# Patient Record
Sex: MALE | Race: WHITE | NOT HISPANIC OR LATINO | URBAN - METROPOLITAN AREA
[De-identification: names, ages, dates, MRNs, and addresses within clinical notes are randomized per-mention and may not be internally consistent; named-entity substitution may affect disease eponyms.]

---
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
## 2023-10-31 NOTE — PROGRESS NOTES
"Chief Complaint  Chief Complaint   Patient presents with    Establish Care    enlarged pec muscles    Gynecomastia        Subjective          Torey Lee is here today to establish care. The following problems were discussed:     Family history- Paternal grandma- HTN, Mother- HTN, anxiety, Grandmother kidney, reflux.     Social history- Denies smoking, drinking, or illegal drug use.     Gynecomastia- Started at age 13-13 years old. Denies family history of it before and has never had labs done before either. He wants to get the surgery done to get them removed.     Overweight - Exercises regularly. Eats well balanced diet.       He is from [place]   Previous PCP was Dinorah Recinos   Marital status- not   Children- No  Works as [employer]   Exercise- regularly 5 times weekly  Diet- Eating well-balanced meals          Review of Systems   Constitutional:  Negative for chills and fever.   HENT:  Negative for congestion.    Eyes: Negative.    Respiratory:  Negative for shortness of breath.    Cardiovascular:  Negative for chest pain.   Gastrointestinal:  Negative for abdominal pain, nausea and vomiting.   Endocrine: Negative for polydipsia and polyuria.   Genitourinary:  Negative for difficulty urinating.   Musculoskeletal:  Negative for myalgias.   Skin:         Back acne    Allergic/Immunologic: Positive for environmental allergies.   Neurological:  Negative for dizziness, light-headedness and headache.   Psychiatric/Behavioral:  Negative for depressed mood. The patient is not nervous/anxious.         Objective   Vital Signs:   Vitals:    11/01/23 1340   BP: 132/74   Pulse: 85   Temp: 99.7 °F (37.6 °C)   SpO2: 100%      Estimated body mass index is 25.38 kg/m² as calculated from the following:    Height as of this encounter: 195.6 cm (77\").    Weight as of this encounter: 97.1 kg (214 lb).    BMI is >= 25 and <30. (Overweight) The following options were offered after discussion;: exercise " counseling/recommendations and nutrition counseling/recommendations            Patient screened positive for depression based on a PHQ-9 score of 0 . Follow-up recommendations include: PCP managing depression.        Physical Exam  Vitals reviewed.   Constitutional:       Appearance: Normal appearance. He is normal weight.   HENT:      Head: Normocephalic and atraumatic.      Right Ear: Tympanic membrane normal.      Ears:      Comments: Wax noted left middle ear      Nose: Nose normal.      Mouth/Throat:      Mouth: Mucous membranes are moist.      Pharynx: Oropharynx is clear.   Eyes:      Extraocular Movements: Extraocular movements intact.      Conjunctiva/sclera: Conjunctivae normal.      Pupils: Pupils are equal, round, and reactive to light.   Cardiovascular:      Rate and Rhythm: Normal rate and regular rhythm.      Pulses: Normal pulses.      Heart sounds: Normal heart sounds.      Comments: S1. S2 audible  Pulmonary:      Effort: Pulmonary effort is normal.      Breath sounds: Normal breath sounds.      Comments: On room air   Chest:      Comments: Enlarged pec muscles   Abdominal:      General: Abdomen is flat. Bowel sounds are normal.      Palpations: Abdomen is soft.   Musculoskeletal:         General: Normal range of motion.      Cervical back: Normal range of motion.   Skin:     General: Skin is warm and dry.   Neurological:      General: No focal deficit present.      Mental Status: He is alert and oriented to person, place, and time. Mental status is at baseline.   Psychiatric:         Mood and Affect: Mood normal.         Behavior: Behavior normal.         Thought Content: Thought content normal.         Judgment: Judgment normal.                Physical Exam   Result Review :             Procedures       Assessment and Plan     Diagnoses and all orders for this visit:    1. Gynecomastia (Primary)  Assessment & Plan:  Started at age 13-15 years old. Denies family history of it before and has never  had labs done before either. He wants to get the surgery done to get them removed. He lifts weights a lot as well, but had this before his weight lifting.     Check TSH, CMP, prolactin, estrogen, and testosterone     Orders:  -     TSH+Free T4; Future  -     Prolactin; Future  -     Testosterone; Future  -     Estrogens, Total; Future    2. Encounter to establish care    3. Annual physical exam  Assessment & Plan:      Encourage safe sex practices   Obtain vaccine records   Encourage healthy diet and exercise    PHQ-score: 0     Family history- Paternal grandma- HTN, Mother- HTN, anxiety, Grandmother kidney, reflux.     Social history- Denies smoking, drinking, or illegal drug use.     Orders:  -     Lipid Panel; Future  -     Comprehensive metabolic panel; Future  -     Hemoglobin A1c; Future  -     Hepatitis C Antibody; Future              Follow Up   Return in about 1 year (around 11/1/2024) for Annual physical.   Patient was given instructions and counseling regarding her condition or for health maintenance advice. Please see specific information pulled into the AVS if appropriate.

## 2023-11-01 ENCOUNTER — OFFICE VISIT (OUTPATIENT)
Dept: FAMILY MEDICINE CLINIC | Facility: CLINIC | Age: 18
End: 2023-11-01
Payer: COMMERCIAL

## 2023-11-01 VITALS
SYSTOLIC BLOOD PRESSURE: 132 MMHG | DIASTOLIC BLOOD PRESSURE: 74 MMHG | OXYGEN SATURATION: 100 % | TEMPERATURE: 99.7 F | HEIGHT: 77 IN | HEART RATE: 85 BPM | WEIGHT: 214 LBS | BODY MASS INDEX: 25.27 KG/M2

## 2023-11-01 DIAGNOSIS — Z00.00 ANNUAL PHYSICAL EXAM: ICD-10-CM

## 2023-11-01 DIAGNOSIS — Z76.89 ENCOUNTER TO ESTABLISH CARE: ICD-10-CM

## 2023-11-01 DIAGNOSIS — N62 GYNECOMASTIA: Primary | ICD-10-CM

## 2023-11-01 NOTE — ASSESSMENT & PLAN NOTE
Started at age 13-13 years old. Denies family history of it before and has never had labs done before either. He wants to get the surgery done to get them removed. He lifts weights a lot as well, but had this before his weight lifting.     Check TSH, CMP, prolactin, estrogen, and testosterone

## 2023-11-01 NOTE — ASSESSMENT & PLAN NOTE
Encourage safe sex practices   Obtain vaccine records   Encourage healthy diet and exercise    PHQ-score: 0     Family history- Paternal grandma- HTN, Mother- HTN, anxiety, Grandmother kidney, reflux.     Social history- Denies smoking, drinking, or illegal drug use.

## 2023-11-01 NOTE — PATIENT INSTRUCTIONS
Check labs- must be fasting  Encourage safe sex practices   Obtain vaccine records   Encourage healthy diet and exercise

## 2023-11-08 ENCOUNTER — CLINICAL SUPPORT (OUTPATIENT)
Dept: FAMILY MEDICINE CLINIC | Facility: CLINIC | Age: 18
End: 2023-11-08
Payer: COMMERCIAL

## 2023-11-08 DIAGNOSIS — N62 GYNECOMASTIA: ICD-10-CM

## 2023-11-08 DIAGNOSIS — Z00.00 ANNUAL PHYSICAL EXAM: ICD-10-CM

## 2023-11-08 LAB
T4 FREE SERPL-MCNC: 1.34 NG/DL (ref 0.93–1.7)
TSH SERPL DL<=0.05 MIU/L-ACNC: 2.51 UIU/ML (ref 0.27–4.2)

## 2023-11-08 PROCEDURE — 84439 ASSAY OF FREE THYROXINE: CPT | Performed by: NURSE PRACTITIONER

## 2023-11-08 PROCEDURE — 83036 HEMOGLOBIN GLYCOSYLATED A1C: CPT | Performed by: NURSE PRACTITIONER

## 2023-11-08 PROCEDURE — 80053 COMPREHEN METABOLIC PANEL: CPT | Performed by: NURSE PRACTITIONER

## 2023-11-08 PROCEDURE — 82672 ASSAY OF ESTROGEN: CPT | Performed by: NURSE PRACTITIONER

## 2023-11-08 PROCEDURE — 84443 ASSAY THYROID STIM HORMONE: CPT | Performed by: NURSE PRACTITIONER

## 2023-11-08 PROCEDURE — 36415 COLL VENOUS BLD VENIPUNCTURE: CPT | Performed by: NURSE PRACTITIONER

## 2023-11-08 PROCEDURE — 86803 HEPATITIS C AB TEST: CPT | Performed by: NURSE PRACTITIONER

## 2023-11-08 PROCEDURE — 80061 LIPID PANEL: CPT | Performed by: NURSE PRACTITIONER

## 2023-11-08 PROCEDURE — 84403 ASSAY OF TOTAL TESTOSTERONE: CPT | Performed by: NURSE PRACTITIONER

## 2023-11-08 PROCEDURE — 84146 ASSAY OF PROLACTIN: CPT | Performed by: NURSE PRACTITIONER

## 2023-11-08 NOTE — PROGRESS NOTES
Venipuncture performed in L ARM by RT Daniel  with good hemostasis. Patient tolerated well. 11/08/23 Gaye Arrington, RT

## 2023-11-09 LAB
ALBUMIN SERPL-MCNC: 4.9 G/DL (ref 3.5–5.2)
ALBUMIN/GLOB SERPL: 2.3 G/DL
ALP SERPL-CCNC: 55 U/L (ref 39–117)
ALT SERPL W P-5'-P-CCNC: 21 U/L (ref 1–41)
ANION GAP SERPL CALCULATED.3IONS-SCNC: 12.3 MMOL/L (ref 5–15)
AST SERPL-CCNC: 26 U/L (ref 1–40)
BILIRUB SERPL-MCNC: 0.7 MG/DL (ref 0–1.2)
BUN SERPL-MCNC: 12 MG/DL (ref 6–20)
BUN/CREAT SERPL: 10.8 (ref 7–25)
CALCIUM SPEC-SCNC: 10 MG/DL (ref 8.6–10.5)
CHLORIDE SERPL-SCNC: 103 MMOL/L (ref 98–107)
CHOLEST SERPL-MCNC: 169 MG/DL (ref 0–200)
CO2 SERPL-SCNC: 23.7 MMOL/L (ref 22–29)
CREAT SERPL-MCNC: 1.11 MG/DL (ref 0.76–1.27)
EGFRCR SERPLBLD CKD-EPI 2021: 97.5 ML/MIN/1.73
GLOBULIN UR ELPH-MCNC: 2.1 GM/DL
GLUCOSE SERPL-MCNC: 81 MG/DL (ref 65–99)
HBA1C MFR BLD: 5.1 % (ref 4.8–5.6)
HCV AB SER DONR QL: NORMAL
HDLC SERPL-MCNC: 45 MG/DL (ref 40–60)
LDLC SERPL CALC-MCNC: 114 MG/DL (ref 0–100)
LDLC/HDLC SERPL: 2.53 {RATIO}
POTASSIUM SERPL-SCNC: 4.6 MMOL/L (ref 3.5–5.2)
PROLACTIN SERPL-MCNC: 12.6 NG/ML (ref 4.04–15.2)
PROT SERPL-MCNC: 7 G/DL (ref 6–8.5)
SODIUM SERPL-SCNC: 139 MMOL/L (ref 136–145)
TESTOST SERPL-MCNC: 759 NG/DL (ref 249–836)
TRIGL SERPL-MCNC: 51 MG/DL (ref 0–150)
VLDLC SERPL-MCNC: 10 MG/DL (ref 5–40)

## 2023-11-10 ENCOUNTER — PATIENT ROUNDING (BHMG ONLY) (OUTPATIENT)
Dept: FAMILY MEDICINE CLINIC | Facility: CLINIC | Age: 18
End: 2023-11-10
Payer: COMMERCIAL

## 2023-11-10 DIAGNOSIS — N62 GYNECOMASTIA: Primary | ICD-10-CM

## 2023-11-10 PROBLEM — F32.9 REACTIVE DEPRESSION: Status: ACTIVE | Noted: 2023-11-10

## 2023-11-12 LAB — ESTROGEN SERPL-MCNC: 70 PG/ML (ref 56–213)

## 2023-12-04 ENCOUNTER — DOCUMENTATION (OUTPATIENT)
Dept: FAMILY MEDICINE CLINIC | Facility: CLINIC | Age: 18
End: 2023-12-04
Payer: COMMERCIAL

## 2024-08-29 ENCOUNTER — HOSPITAL ENCOUNTER (EMERGENCY)
Facility: HOSPITAL | Age: 19
Discharge: HOME | End: 2024-08-29
Attending: EMERGENCY MEDICINE
Payer: COMMERCIAL

## 2024-08-29 VITALS
TEMPERATURE: 98.6 F | SYSTOLIC BLOOD PRESSURE: 170 MMHG | HEART RATE: 70 BPM | HEIGHT: 77 IN | OXYGEN SATURATION: 100 % | RESPIRATION RATE: 16 BRPM | DIASTOLIC BLOOD PRESSURE: 85 MMHG | WEIGHT: 228.9 LBS | BODY MASS INDEX: 27.03 KG/M2

## 2024-08-29 DIAGNOSIS — H66.92 LEFT OTITIS MEDIA, UNSPECIFIED OTITIS MEDIA TYPE: Primary | ICD-10-CM

## 2024-08-29 PROCEDURE — 99282 EMERGENCY DEPT VISIT SF MDM: CPT

## 2024-08-29 PROCEDURE — 99283 EMERGENCY DEPT VISIT LOW MDM: CPT | Performed by: NURSE PRACTITIONER

## 2024-08-29 RX ORDER — AMOXICILLIN 500 MG/1
1000 CAPSULE ORAL 2 TIMES DAILY
Qty: 40 CAPSULE | Refills: 0 | Status: SHIPPED | OUTPATIENT
Start: 2024-08-29 | End: 2024-09-08

## 2024-08-30 NOTE — FSED PROVIDER NOTE
Subjective   History of Present Illness  The patient is a 18-year-old male who presents to the ER with left ear pain that started earlier in the week. Patient reports he has had multiple ear surgeries in the past.  Patient denies any fevers or drainage.    History provided by:  Patient   used: No        Review of Systems   HENT:  Positive for ear pain.        History reviewed. No pertinent past medical history.    Allergies   Allergen Reactions    Cinnamon Rash       Past Surgical History:   Procedure Laterality Date    TONSILLECTOMY  2008    Tonsils removed when i was 6.    TYMPANOPLASTY Bilateral     right in 2008; left in 2009       Family History   Problem Relation Age of Onset    Anxiety disorder Mother     Hyperlipidemia Mother     Hyperlipidemia Maternal Grandmother     Kidney disease Maternal Grandmother         Kidney reflux    Diabetes Paternal Grandmother        Social History     Socioeconomic History    Marital status: Single   Tobacco Use    Smoking status: Never     Passive exposure: Never    Smokeless tobacco: Never   Vaping Use    Vaping status: Never Used   Substance and Sexual Activity    Alcohol use: Never    Drug use: Never    Sexual activity: Yes     Partners: Female     Birth control/protection: Condom, Birth control pill     Comment: Fiancé for about 1 year.           Objective   Physical Exam  Vitals and nursing note reviewed.   Constitutional:       Appearance: Normal appearance.   HENT:      Head: Normocephalic.      Right Ear: Tympanic membrane and ear canal normal.      Left Ear: Tenderness present. A middle ear effusion is present. Tympanic membrane is erythematous and bulging.      Nose: Nose normal.      Mouth/Throat:      Lips: Pink.      Mouth: Mucous membranes are moist.      Pharynx: Oropharynx is clear. Uvula midline.   Eyes:      Conjunctiva/sclera: Conjunctivae normal.      Pupils: Pupils are equal, round, and reactive to light.   Musculoskeletal:          General: Normal range of motion.      Cervical back: Full passive range of motion without pain, normal range of motion and neck supple.   Skin:     General: Skin is warm and dry.   Neurological:      General: No focal deficit present.      Mental Status: He is alert and oriented to person, place, and time.   Psychiatric:         Mood and Affect: Mood normal.         Behavior: Behavior normal. Behavior is cooperative.         Procedures           ED Course                                           Medical Decision Making  The patient is a 18-year-old male who presents to the ER with left ear pain that started earlier in the week. Patient reports he has had multiple ear surgeries in the past.  Patient denies any fevers or drainage.    Well-appearing patient with symptoms/signs consistent with acute otitis media. No evidence of mastoiditis, sinusitis and patient at baseline mental status making intracranial abscess, meningitis, or other intracranial process unlikely. Symptoms are also not consistent with more concerning sepsis or focal bacterial infection. Patient is tolerating POs and able to take medications as an outpatient. Pain controlled in emergency room and parents instructed on outpatient pain control. Parents given strict return precautions and agreed with assessment and plan. Patient placed on amoxil and advised to follow up with ENT. Given [facility] number       Problems Addressed:  Left otitis media, unspecified otitis media type: complicated acute illness or injury    Risk  Prescription drug management.        Final diagnoses:   Left otitis media, unspecified otitis media type       ED Disposition  ED Disposition       ED Disposition   Discharge    Condition   Stable    Comment   --               [facility]  [address]  [facility] [address]  [phone]  In 1 week  As needed, If symptoms worsen         Medication List        New Prescriptions      amoxicillin 500 MG  capsule  Commonly known as: AMOXIL  Take 2 capsules by mouth 2 (Two) Times a Day for 10 days.               Where to Get Your Medications        These medications were sent to [facility]/pharmacy #[id number] - [place], [place] - [address] [place] [address] - [phone]  - [phone] [address] [place] [address]      Hours: 24-hours Phone: [phone]   amoxicillin 500 MG capsule       I was in the department and available for consult.  I did not provide any direct patient care at this time

## 2024-08-30 NOTE — DISCHARGE INSTRUCTIONS
Call for a follow up appointment with your primary care for further evaluation and treatment.     Follow up with [facility] as needed.     Medications as precribed,     Tylenol/Motrin as needed for pain/fevers    Make sure patient is drinking plenty of fluids.    Return for any new or worsening symptoms.

## 2025-01-27 NOTE — PROGRESS NOTES
"Chief Complaint  Chief Complaint   Patient presents with    Labs Only     Pt stated he just wants labs and a TDAP         Subjective          Torey Lee is a 19-year-old male who reports to the office today for physical.    Annual physical- Denies any new family history. Family history- Paternal grandma- HTN, Mother- HTN, anxiety, Grandmother kidney, reflux.    Social history- Denies smoking, drinks once a week, or illegal drug use.      Gynecomastia- Started at age 13-15 years old. He underwent surgery and insurance covered it. He feels better.      Overweight - Exercises everyday. He is on keto diet.       Labs- Due       Vaccines:  Flu-Refused   TDAP- Due - patient has a family baby on the way and told to get this   Covid-19-Not recicing anymore  HPV vaccine- Received     Dental exam-UTD   Eye exam-Denies any visual issues          Review of Systems   Constitutional:  Negative for chills and fever.   HENT:  Negative for congestion.    Eyes: Negative.    Respiratory:  Negative for shortness of breath.    Cardiovascular:  Negative for chest pain.   Gastrointestinal:  Negative for abdominal pain, nausea and vomiting.   Genitourinary:  Negative for difficulty urinating.   Musculoskeletal:  Negative for myalgias.   Skin: Negative.    Neurological:  Negative for dizziness, light-headedness and headache.   Psychiatric/Behavioral:  Negative for self-injury, suicidal ideas and depressed mood. The patient is not nervous/anxious.         Objective   Vital Signs:   Vitals:    01/29/25 0815   BP: 114/70   Pulse: 64   Temp: 98.6 °F (37 °C)   SpO2: 98%      Estimated body mass index is 25.85 kg/m² as calculated from the following:    Height as of this encounter: 195.6 cm (77.01\").    Weight as of this encounter: 98.9 kg (218 lb).    BMI is >= 25 and <30. (Overweight) The following options were offered after discussion;: exercise counseling/recommendations and nutrition counseling/recommendations            Patient " screened negative for depression based on a PHQ-9 score of 0 on 1/29/2025 . Follow-up recommendations include: PCP managing depression.        Physical Exam  Vitals reviewed.   Constitutional:       Appearance: Normal appearance.   HENT:      Head: Normocephalic and atraumatic.      Right Ear: Tympanic membrane, ear canal and external ear normal.      Left Ear: Tympanic membrane, ear canal and external ear normal.      Ears:      Comments: Freckle noted in right ear, erythema note bilaterally in middle ear, left ear some cloudiness      Nose: Nose normal.      Mouth/Throat:      Mouth: Mucous membranes are moist.      Pharynx: Oropharynx is clear.   Eyes:      Extraocular Movements: Extraocular movements intact.      Conjunctiva/sclera: Conjunctivae normal.      Pupils: Pupils are equal, round, and reactive to light.   Cardiovascular:      Rate and Rhythm: Normal rate and regular rhythm.      Pulses: Normal pulses.      Heart sounds: Normal heart sounds.      Comments: S1, S2 audible  Pulmonary:      Effort: Pulmonary effort is normal.      Breath sounds: Normal breath sounds.      Comments: On room air   Abdominal:      General: Abdomen is flat. Bowel sounds are normal.      Palpations: Abdomen is soft.   Musculoskeletal:         General: Normal range of motion.      Cervical back: Normal range of motion and neck supple.   Skin:     General: Skin is warm and dry.      Comments: Tattoo noted on left arm   Neurological:      General: No focal deficit present.      Mental Status: He is alert and oriented to person, place, and time. Mental status is at baseline.   Psychiatric:         Mood and Affect: Mood normal.         Behavior: Behavior normal.         Thought Content: Thought content normal.         Judgment: Judgment normal.                Physical Exam   Result Review :             Procedures       Assessment and Plan     Diagnoses and all orders for this visit:    1. Annual physical exam (Primary)  Assessment &  Plan:    Annual physical- Denies any new family history. Family history- Paternal grandma- HTN, Mother- HTN, anxiety, Grandmother kidney, reflux.    Social history- Denies smoking, drinks once a week, or illegal drug use.     Labs- Due       Vaccines:  Flu-Refused   TDAP- Due - patient has a family baby on the way and told to get this   Covid-19-Not recicing anymore  HPV vaccine- Received     Dental exam-UTD   Eye exam-Denies any visual issues     Encourage healthy diet and exercise  Encourage safe sex practices  Check labs  TDAP vaccine received- has baby in family arriving soon     Orders:  -     Comprehensive Metabolic Panel  -     Hemoglobin A1c  -     Lipid Panel    2. Gynecomastia  Assessment & Plan:  Gynecomastia- Started at age 13-15 years old. He underwent surgery and insurance covered it. He feels better.       3. Overweight (BMI 25.0-29.9)  Assessment & Plan:  Patient's (Body mass index is 25.85 kg/m².)     Overweight - Exercises everyday. He is on keto diet.     Continue healthy diet and exercise       4. Other fatigue  -     Testosterone    Other orders  -     Tdap Vaccine Greater Than or Equal To 8yo IM              Follow Up   Return in about 1 year (around 1/29/2026) for Annual physical.   Patient was given instructions and counseling regarding her condition or for health maintenance advice. Please see specific information pulled into the AVS if appropriate.

## 2025-01-28 PROBLEM — Z76.89 ENCOUNTER TO ESTABLISH CARE: Status: RESOLVED | Noted: 2023-11-01 | Resolved: 2025-01-28

## 2025-01-28 NOTE — ASSESSMENT & PLAN NOTE
Annual physical- Denies any new family history. Family history- Paternal grandma- HTN, Mother- HTN, anxiety, Grandmother kidney, reflux.    Social history- Denies smoking, drinks once a week, or illegal drug use.     Labs- Due       Vaccines:  Flu-Refused   TDAP- Due - patient has a family baby on the way and told to get this   Covid-19-Not recicing anymore  HPV vaccine- Received     Dental exam-UTD   Eye exam-Denies any visual issues     Encourage healthy diet and exercise  Encourage safe sex practices  Check labs  TDAP vaccine received- has baby in family arriving soon

## 2025-01-29 ENCOUNTER — OFFICE VISIT (OUTPATIENT)
Dept: FAMILY MEDICINE CLINIC | Facility: CLINIC | Age: 20
End: 2025-01-29
Payer: COMMERCIAL

## 2025-01-29 VITALS
TEMPERATURE: 98.6 F | OXYGEN SATURATION: 98 % | WEIGHT: 218 LBS | SYSTOLIC BLOOD PRESSURE: 114 MMHG | DIASTOLIC BLOOD PRESSURE: 70 MMHG | HEART RATE: 64 BPM | HEIGHT: 77 IN | BODY MASS INDEX: 25.74 KG/M2

## 2025-01-29 DIAGNOSIS — N62 GYNECOMASTIA: ICD-10-CM

## 2025-01-29 DIAGNOSIS — R53.83 OTHER FATIGUE: ICD-10-CM

## 2025-01-29 DIAGNOSIS — Z00.00 ANNUAL PHYSICAL EXAM: Primary | ICD-10-CM

## 2025-01-29 DIAGNOSIS — E66.3 OVERWEIGHT (BMI 25.0-29.9): ICD-10-CM

## 2025-01-29 LAB — HBA1C MFR BLD: 4.6 % (ref 4.8–5.6)

## 2025-01-29 PROCEDURE — 36415 COLL VENOUS BLD VENIPUNCTURE: CPT | Performed by: NURSE PRACTITIONER

## 2025-01-29 PROCEDURE — 80053 COMPREHEN METABOLIC PANEL: CPT | Performed by: NURSE PRACTITIONER

## 2025-01-29 PROCEDURE — 90715 TDAP VACCINE 7 YRS/> IM: CPT | Performed by: NURSE PRACTITIONER

## 2025-01-29 PROCEDURE — 90471 IMMUNIZATION ADMIN: CPT | Performed by: NURSE PRACTITIONER

## 2025-01-29 PROCEDURE — 84403 ASSAY OF TOTAL TESTOSTERONE: CPT | Performed by: NURSE PRACTITIONER

## 2025-01-29 PROCEDURE — 99395 PREV VISIT EST AGE 18-39: CPT | Performed by: NURSE PRACTITIONER

## 2025-01-29 PROCEDURE — 83036 HEMOGLOBIN GLYCOSYLATED A1C: CPT | Performed by: NURSE PRACTITIONER

## 2025-01-29 PROCEDURE — 80061 LIPID PANEL: CPT | Performed by: NURSE PRACTITIONER

## 2025-01-29 NOTE — ASSESSMENT & PLAN NOTE
Gynecomastia- Started at age 13-15 years old. He underwent surgery and insurance covered it. He feels better.

## 2025-01-29 NOTE — PATIENT INSTRUCTIONS
Encourage healthy diet and exercise  Encourage safe sex practices  Check labs  TDAP vaccine received

## 2025-01-29 NOTE — PROGRESS NOTES
Venipuncture performed in right arm with good hemostasis. Patient tolerated well. Leila MIRANDA MA

## 2025-01-29 NOTE — ASSESSMENT & PLAN NOTE
Patient's (Body mass index is 25.85 kg/m².)     Overweight - Exercises everyday. He is on keto diet.     Continue healthy diet and exercise

## 2025-01-30 LAB
ALBUMIN SERPL-MCNC: 4.5 G/DL (ref 3.5–5.2)
ALBUMIN/GLOB SERPL: 1.7 G/DL
ALP SERPL-CCNC: 71 U/L (ref 39–117)
ALT SERPL W P-5'-P-CCNC: 28 U/L (ref 1–41)
ANION GAP SERPL CALCULATED.3IONS-SCNC: 10.8 MMOL/L (ref 5–15)
AST SERPL-CCNC: 22 U/L (ref 1–40)
BILIRUB SERPL-MCNC: 0.6 MG/DL (ref 0–1.2)
BUN SERPL-MCNC: 16 MG/DL (ref 6–20)
BUN/CREAT SERPL: 13.1 (ref 7–25)
CALCIUM SPEC-SCNC: 9.7 MG/DL (ref 8.6–10.5)
CHLORIDE SERPL-SCNC: 105 MMOL/L (ref 98–107)
CHOLEST SERPL-MCNC: 195 MG/DL (ref 0–200)
CO2 SERPL-SCNC: 22.2 MMOL/L (ref 22–29)
CREAT SERPL-MCNC: 1.22 MG/DL (ref 0.76–1.27)
EGFRCR SERPLBLD CKD-EPI 2021: 86.5 ML/MIN/1.73
GLOBULIN UR ELPH-MCNC: 2.7 GM/DL
GLUCOSE SERPL-MCNC: 73 MG/DL (ref 65–99)
HDLC SERPL-MCNC: 35 MG/DL (ref 40–60)
LDLC SERPL CALC-MCNC: 145 MG/DL (ref 0–100)
LDLC/HDLC SERPL: 4.11 {RATIO}
POTASSIUM SERPL-SCNC: 4.5 MMOL/L (ref 3.5–5.2)
PROT SERPL-MCNC: 7.2 G/DL (ref 6–8.5)
SODIUM SERPL-SCNC: 138 MMOL/L (ref 136–145)
TESTOST SERPL-MCNC: 776 NG/DL (ref 249–836)
TRIGL SERPL-MCNC: 81 MG/DL (ref 0–150)
VLDLC SERPL-MCNC: 15 MG/DL (ref 5–40)